# Patient Record
Sex: FEMALE | Race: OTHER | URBAN - METROPOLITAN AREA
[De-identification: names, ages, dates, MRNs, and addresses within clinical notes are randomized per-mention and may not be internally consistent; named-entity substitution may affect disease eponyms.]

---

## 2024-08-13 ENCOUNTER — EMERGENCY (EMERGENCY)
Facility: HOSPITAL | Age: 24
LOS: 0 days | Discharge: HOME | End: 2024-08-13
Payer: SELF-PAY

## 2024-08-13 VITALS
OXYGEN SATURATION: 97 % | HEIGHT: 65.35 IN | TEMPERATURE: 99 F | WEIGHT: 184.97 LBS | HEART RATE: 78 BPM | DIASTOLIC BLOOD PRESSURE: 83 MMHG | SYSTOLIC BLOOD PRESSURE: 125 MMHG | RESPIRATION RATE: 17 BRPM

## 2024-08-13 DIAGNOSIS — L29.9 PRURITUS, UNSPECIFIED: ICD-10-CM

## 2024-08-13 DIAGNOSIS — T78.40XA ALLERGY, UNSPECIFIED, INITIAL ENCOUNTER: ICD-10-CM
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
## 2024-08-13 DIAGNOSIS — R21 RASH AND OTHER NONSPECIFIC SKIN ERUPTION: ICD-10-CM

## 2024-08-13 DIAGNOSIS — T49.4X5A ADVERSE EFFECT OF KERATOLYTICS, KERATOPLASTICS, AND OTHER HAIR TREATMENT DRUGS AND PREPARATIONS, INITIAL ENCOUNTER: ICD-10-CM
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
## 2024-08-13 LAB — HCG UR QL: NEGATIVE — SIGNIFICANT CHANGE UP

## 2024-08-13 PROCEDURE — 99284 EMERGENCY DEPT VISIT MOD MDM: CPT

## 2024-08-13 RX ORDER — DEXAMETHASONE 1.5 MG/1
10 TABLET ORAL ONCE
Refills: 0 | Status: COMPLETED | OUTPATIENT
Start: 2024-08-13 | End: 2024-08-13

## 2024-08-13 RX ADMIN — DEXAMETHASONE 10 MILLIGRAM(S): 1.5 TABLET ORAL at 14:41

## 2024-08-13 NOTE — ED PROVIDER NOTE - CLINICAL SUMMARY MEDICAL DECISION MAKING FREE TEXT BOX
ddx includes, irritant dermatitis, allergic reaction, anaphylaxis  no evidence of anaphylaxis  no current sxs-- all resolved with benadryl PTA  given dex here, cousneled on picking up otc benadryl and to return for any recurrent sxs.   upreg neg  Reviewed necessity for follow up. Counseled on red flags and to return for them.  Patient appears well on discharge.

## 2024-08-13 NOTE — ED PROVIDER NOTE - OBJECTIVE STATEMENT
Alisson id #[id number]  25 y/o F with PMH migraines presents with now resolved itchy red rash to neck s/p washing out keratin treatment which was left in hair x 4 days. resolved with benadryl given by EMS.   no rash anywhere else   no n/v/d/cp/sob/throat closing sensation/fever/cough/congestion/abd pain/swelling.   no current sxs.   has no known allergens.

## 2024-08-13 NOTE — ED PROVIDER NOTE - PATIENT PORTAL LINK FT
You can access the FollowMyHealth Patient Portal offered by [facility] by registering at the following website: [email or web address]. By joining [email or web address] FollowMyHealth portal, you will also be able to view your health information using other applications (apps) compatible with our system.

## 2024-08-13 NOTE — ED PROVIDER NOTE - PHYSICAL EXAMINATION
PHYSICAL EXAM:    GENERAL: Alert, appears stated age, well appearing, non-toxic  SKIN: Warm and dry. MMM. no rash  HEAD: NC, AT  EYE: Normal lids/conjunctiva,  ENT: Normal hearing, patent oropharynx without erythema or exudate/edema. no floor of mouth, roof of mouth, tongue, lip, uvular swelling.   NECK: +supple. No meningismus, or JVD, +Trachea midline. no tenderness/step offs.   Pulm: Bilateral BS, normal resp effort, no wheezes, stridor, or retractions  CV: RRR, no M/R/G, 2+and = radial pulses  Abd: soft, non-tender, non-distended. no rebound/guarding .   Mskel: no erythema, cyanosis, edema. no calf tenderness  Neuro: AAOx3, normal gait.

## 2024-08-13 NOTE — ED ADULT NURSE NOTE - OBJECTIVE STATEMENT
While working today had an episode of itching and couldn't breath, given Benadryl po by EMS but feels better, Irish speaking with  needed, rash on arms started a week ago LMP none , device placed in [place] 3 years ago Works at a shelter.  Alisson [id number] les

## 2024-08-13 NOTE — ED PROVIDER NOTE - CARE PROVIDER_API CALL
your pmd in 1-3 days,   Phone: (   )    -  Fax: (   )    -  Follow Up Time:     Terry Haines  Allergy and Immunology  [address] [place] [address], [address] [place] [id number]  Phone: [phone]  Fax: [phone]  Follow Up Time: 1-3 Days

## 2024-08-13 NOTE — ED ADULT TRIAGE NOTE - CHIEF COMPLAINT QUOTE
BIBA from work for possible allergic reaction to keratin product that was used on her hair three days ago, as per emt, patient had a hair treatment done 3 days ago and washed it today, patient states she started to scratch her neck due to itchiness and felt it on her lower face. patient was given 50mg benadryl by emt for rash on neck. in triage, no noted rash or any allergic reaction, patient states she feels better, denies sob, denies itchiness

## 2024-08-13 NOTE — ED PROVIDER NOTE - PROVIDER TOKENS
FREE:[LAST:[your pmd in 1-3 days],PHONE:[(   )    -],FAX:[(   )    -]],PROVIDER:[TOKEN:[[id number]],FOLLOWUP:[1-3 Days]]

## 2024-08-13 NOTE — ED ADULT NURSE NOTE - NSFALLUNIVINTERV_ED_ALL_ED
Bed/Stretcher in lowest position, wheels locked, appropriate side rails in place/Call bell, personal items and telephone in reach/Instruct patient to call for assistance before getting out of bed/chair/stretcher/Non-slip footwear applied when patient is off stretcher/[place] to call system/Physically safe environment - no spills, clutter or unnecessary equipment/Purposeful proactive rounding/Room/bathroom lighting operational, light cord in reach
